# Patient Record
Sex: MALE | Race: OTHER | HISPANIC OR LATINO | URBAN - METROPOLITAN AREA
[De-identification: names, ages, dates, MRNs, and addresses within clinical notes are randomized per-mention and may not be internally consistent; named-entity substitution may affect disease eponyms.]

---

## 2017-12-12 ENCOUNTER — EMERGENCY (EMERGENCY)
Facility: HOSPITAL | Age: 27
LOS: 1 days | Discharge: HOME | End: 2017-12-12
Attending: EMERGENCY MEDICINE | Admitting: EMERGENCY MEDICINE
Payer: COMMERCIAL

## 2017-12-12 VITALS
DIASTOLIC BLOOD PRESSURE: 84 MMHG | SYSTOLIC BLOOD PRESSURE: 132 MMHG | HEART RATE: 66 BPM | TEMPERATURE: 98 F | WEIGHT: 160.06 LBS | RESPIRATION RATE: 18 BRPM | OXYGEN SATURATION: 97 %

## 2017-12-12 VITALS
HEART RATE: 57 BPM | RESPIRATION RATE: 18 BRPM | DIASTOLIC BLOOD PRESSURE: 67 MMHG | TEMPERATURE: 99 F | SYSTOLIC BLOOD PRESSURE: 102 MMHG | OXYGEN SATURATION: 97 %

## 2017-12-12 DIAGNOSIS — Z90.49 ACQUIRED ABSENCE OF OTHER SPECIFIED PARTS OF DIGESTIVE TRACT: Chronic | ICD-10-CM

## 2017-12-12 DIAGNOSIS — Z90.89 ACQUIRED ABSENCE OF OTHER ORGANS: ICD-10-CM

## 2017-12-12 DIAGNOSIS — R10.32 LEFT LOWER QUADRANT PAIN: ICD-10-CM

## 2017-12-12 DIAGNOSIS — Z88.1 ALLERGY STATUS TO OTHER ANTIBIOTIC AGENTS STATUS: ICD-10-CM

## 2017-12-12 LAB
ALBUMIN SERPL ELPH-MCNC: 4.5 G/DL — SIGNIFICANT CHANGE UP (ref 3.3–5)
ALP SERPL-CCNC: 88 U/L — SIGNIFICANT CHANGE UP (ref 40–120)
ALT FLD-CCNC: 28 U/L — SIGNIFICANT CHANGE UP (ref 10–45)
ANION GAP SERPL CALC-SCNC: 13 MMOL/L — SIGNIFICANT CHANGE UP (ref 5–17)
APPEARANCE UR: CLEAR — SIGNIFICANT CHANGE UP
AST SERPL-CCNC: 20 U/L — SIGNIFICANT CHANGE UP (ref 10–40)
BASOPHILS NFR BLD AUTO: 0.2 % — SIGNIFICANT CHANGE UP (ref 0–2)
BILIRUB SERPL-MCNC: 0.9 MG/DL — SIGNIFICANT CHANGE UP (ref 0.2–1.2)
BILIRUB UR-MCNC: NEGATIVE — SIGNIFICANT CHANGE UP
BUN SERPL-MCNC: 9 MG/DL — SIGNIFICANT CHANGE UP (ref 7–23)
CALCIUM SERPL-MCNC: 9.5 MG/DL — SIGNIFICANT CHANGE UP (ref 8.4–10.5)
CHLORIDE SERPL-SCNC: 98 MMOL/L — SIGNIFICANT CHANGE UP (ref 96–108)
CO2 SERPL-SCNC: 25 MMOL/L — SIGNIFICANT CHANGE UP (ref 22–31)
COLOR SPEC: YELLOW — SIGNIFICANT CHANGE UP
CREAT SERPL-MCNC: 0.82 MG/DL — SIGNIFICANT CHANGE UP (ref 0.5–1.3)
DIFF PNL FLD: NEGATIVE — SIGNIFICANT CHANGE UP
EOSINOPHIL NFR BLD AUTO: 1.5 % — SIGNIFICANT CHANGE UP (ref 0–6)
GLUCOSE SERPL-MCNC: 92 MG/DL — SIGNIFICANT CHANGE UP (ref 70–99)
GLUCOSE UR QL: NEGATIVE — SIGNIFICANT CHANGE UP
HCT VFR BLD CALC: 47.7 % — SIGNIFICANT CHANGE UP (ref 39–50)
HGB BLD-MCNC: 16.4 G/DL — SIGNIFICANT CHANGE UP (ref 13–17)
KETONES UR-MCNC: NEGATIVE — SIGNIFICANT CHANGE UP
LEUKOCYTE ESTERASE UR-ACNC: NEGATIVE — SIGNIFICANT CHANGE UP
LIDOCAIN IGE QN: 16 U/L — SIGNIFICANT CHANGE UP (ref 7–60)
LYMPHOCYTES # BLD AUTO: 25.8 % — SIGNIFICANT CHANGE UP (ref 13–44)
MCHC RBC-ENTMCNC: 29.7 PG — SIGNIFICANT CHANGE UP (ref 27–34)
MCHC RBC-ENTMCNC: 34.4 G/DL — SIGNIFICANT CHANGE UP (ref 32–36)
MCV RBC AUTO: 86.3 FL — SIGNIFICANT CHANGE UP (ref 80–100)
MONOCYTES NFR BLD AUTO: 9.2 % — SIGNIFICANT CHANGE UP (ref 2–14)
NEUTROPHILS NFR BLD AUTO: 63.3 % — SIGNIFICANT CHANGE UP (ref 43–77)
NITRITE UR-MCNC: NEGATIVE — SIGNIFICANT CHANGE UP
PH UR: 8 — SIGNIFICANT CHANGE UP (ref 5–8)
PLATELET # BLD AUTO: 197 K/UL — SIGNIFICANT CHANGE UP (ref 150–400)
POTASSIUM SERPL-MCNC: 3.9 MMOL/L — SIGNIFICANT CHANGE UP (ref 3.5–5.3)
POTASSIUM SERPL-SCNC: 3.9 MMOL/L — SIGNIFICANT CHANGE UP (ref 3.5–5.3)
PROT SERPL-MCNC: 8 G/DL — SIGNIFICANT CHANGE UP (ref 6–8.3)
PROT UR-MCNC: NEGATIVE MG/DL — SIGNIFICANT CHANGE UP
RBC # BLD: 5.53 M/UL — SIGNIFICANT CHANGE UP (ref 4.2–5.8)
RBC # FLD: 13.4 % — SIGNIFICANT CHANGE UP (ref 10.3–16.9)
SODIUM SERPL-SCNC: 136 MMOL/L — SIGNIFICANT CHANGE UP (ref 135–145)
SP GR SPEC: 1.01 — SIGNIFICANT CHANGE UP (ref 1–1.03)
UROBILINOGEN FLD QL: 0.2 E.U./DL — SIGNIFICANT CHANGE UP
WBC # BLD: 4.6 K/UL — SIGNIFICANT CHANGE UP (ref 3.8–10.5)
WBC # FLD AUTO: 4.6 K/UL — SIGNIFICANT CHANGE UP (ref 3.8–10.5)

## 2017-12-12 PROCEDURE — 81003 URINALYSIS AUTO W/O SCOPE: CPT

## 2017-12-12 PROCEDURE — 80053 COMPREHEN METABOLIC PANEL: CPT

## 2017-12-12 PROCEDURE — 99285 EMERGENCY DEPT VISIT HI MDM: CPT

## 2017-12-12 PROCEDURE — 83690 ASSAY OF LIPASE: CPT

## 2017-12-12 PROCEDURE — 74177 CT ABD & PELVIS W/CONTRAST: CPT | Mod: 26

## 2017-12-12 PROCEDURE — 85025 COMPLETE CBC W/AUTO DIFF WBC: CPT

## 2017-12-12 PROCEDURE — 74177 CT ABD & PELVIS W/CONTRAST: CPT

## 2017-12-12 PROCEDURE — 99284 EMERGENCY DEPT VISIT MOD MDM: CPT | Mod: 25

## 2017-12-12 PROCEDURE — 36415 COLL VENOUS BLD VENIPUNCTURE: CPT

## 2017-12-12 RX ORDER — IOHEXOL 300 MG/ML
50 INJECTION, SOLUTION INTRAVENOUS ONCE
Qty: 0 | Refills: 0 | Status: COMPLETED | OUTPATIENT
Start: 2017-12-12 | End: 2017-12-12

## 2017-12-12 RX ADMIN — IOHEXOL 50 MILLILITER(S): 300 INJECTION, SOLUTION INTRAVENOUS at 15:52

## 2017-12-12 RX ADMIN — Medication 1 TABLET(S): at 19:57

## 2017-12-12 NOTE — ED PROVIDER NOTE - OBJECTIVE STATEMENT
26yo M hx appendectomy here w/ LLQ abd pain x3 days. denies n/v/d, gu symptoms, testicular/penile pain or discharge.  [place] [facility] and was told to come here to CT scan because his insurance does not cover it there.  denies fever/chills. Never had this before. no fh of kidney stones. no radiation to flank. did not take any  meds, and does not want any here, pain is tolerable. States they did UA at urgent care and negative. They attempted to get outpatient CT but pts insurance did not cover it so was directed to the ED instead.

## 2017-12-12 NOTE — CONSULT NOTE ADULT - ASSESSMENT
27M with LLQ abdominal pain and CT findings concerning for infiltration of the fat anterior to the distal colon.  With evidence of diverticulosis and 6 days of pain, concern for developing diverticulitis.    - Would recommend PO Augmentin for 1 week and to follow up with Dr. Schilling next week [phone]  - Dispo as per ED  - Pt seen and examined with Dr. Schilling, discussed recommendations with ED Staff.

## 2017-12-12 NOTE — ED PROVIDER NOTE - MEDICAL DECISION MAKING DETAILS
here w/ LLQ tenderness w/ equivocal rebound, concern for complicated diverticulitis so CT w/ PO and IV contrast done. here w/ LLQ tenderness w/ equivocal rebound, concern for complicated diverticulitis so CT w/ PO and IV contrast done. neg for this, likely epiploic appendigitis. plan for otc meds and f/u gi. DC home in NAD with strict return precautions given.

## 2017-12-12 NOTE — ED PROVIDER NOTE - PHYSICAL EXAMINATION
CONSTITUTIONAL: Well-appearing; well-nourished; in no apparent distress.   HEAD: Normocephalic; atraumatic.   EYES:  conjunctiva and sclera clear  ENT: normal nose; no rhinorrhea; normal pharynx with no erythema or lesions.   NECK: Supple; non-tender;   CARDIOVASCULAR: Normal S1, S2; no murmurs, rubs, or gallops. Regular rate and rhythm.   RESPIRATORY: Breathing easily; breath sounds clear and equal bilaterally; no wheezes, rhonchi, or rales.  GI: Soft; non-distended; +LLQ tenderness, when asked about rebound states same amount of pain as w/ palpation; no palpable organomegaly. no cva tenderness.  EXT: No cyanosis or edema; N/V intact  SKIN: Normal for age and race; warm; dry; good turgor; no apparent lesions or rash.   NEURO: A & O x 3; face symmetric; grossly unremarkable.   PSYCHOLOGICAL: The patient’s mood and manner are appropriate.

## 2017-12-12 NOTE — CONSULT NOTE ADULT - SUBJECTIVE AND OBJECTIVE BOX
27M otherwise healthy with a surgical hx of an open appendectomy (2 yoa), b/l ochiopexy for testicular torsion (6 yoa) and an exploratory laparotomy with a SBR for an SBO () presenting to the [facility] ED with a 6 day hx of worsening, constant, crampy LLQ abdominal pain.  He denies any fevers, chills, nausea, vomiting, chest pain, SOB, dyspnea, change in bowel function, diarrhea, dysuria.    PMH: None  PSH: Appendectomy, SBR, orchiopexy b/l  All: Levaquin (intolerance - headache and felt faint)  Family Hx: denies IBD, colon cancer  Social: Never smoker, social ETOH, denies IVDA    Vital Signs Last 24 Hrs  T(C): 36.7 (12 Dec 2017 14:49), Max: 36.7 (12 Dec 2017 14:49)  T(F): 98.1 (12 Dec 2017 14:49), Max: 98.1 (12 Dec 2017 14:49)  HR: 66 (12 Dec 2017 14:49) (66 - 66)  BP: 132/84 (12 Dec 2017 14:49) (132/84 - 132/84)  BP(mean): --  RR: 18 (12 Dec 2017 14:49) (18 - 18)  SpO2: 97% (12 Dec 2017 14:49) (97% - 97%)    PHYSICAL EXAM:    Gen: Age appropriate male in NAD  CV: RRR  Resp: CTAB  Abd: Normoactive BS, soft, tender to palpation in the LLQ with no associated rebound/guarding/rigidity, nondistended.  Old well healed midline scar and mcburney incision.  Ext: WWP, no peripheral edema        LABS:                        16.4   4.6   )-----------( 197      ( 12 Dec 2017 16:01 )             47.7         136  |  98  |  9   ----------------------------<  92  3.9   |  25  |  0.82    Ca    9.5      12 Dec 2017 16:01    TPro  8.0  /  Alb  4.5  /  TBili  0.9  /  DBili  x   /  AST  20  /  ALT  28  /  AlkPhos  88  1212      Urinalysis Basic - ( 12 Dec 2017 16:01 )    Color: Yellow / Appearance: Clear / S.010 / pH: x  Gluc: x / Ketone: NEGATIVE  / Bili: Negative / Urobili: 0.2 E.U./dL   Blood: x / Protein: NEGATIVE mg/dL / Nitrite: NEGATIVE   Leuk Esterase: NEGATIVE / RBC: x / WBC x   Sq Epi: x / Non Sq Epi: x / Bacteria: x        RADIOLOGY & ADDITIONAL STUDIES:    INTERPRETATION:  Resident preliminary report    CT of the ABDOMEN and PELVIS with intravenous contrast dated 2017   6:07 PM    INDICATION: Worsening left lower quadrant pain for 5 days, with mild   rebound tenderness. S/p appendectomy.     TECHNIQUE: CT of the abdomen and pelvis was performed using oral and   intravenous contrast. Axial, sagittal and coronal images were produced   and reviewed.    PRIOR STUDIES: None.    FINDINGS: Images of the lower chest demonstrate no significant   abnormality.    The liver is normal in appearance.  No radiopaque stones are seen in the   gallbladder.  The pancreas is normal in appearance.  No splenic   abnormalities are seen.    The adrenal glands are unremarkable. The kidneys are normal in   appearance.        No abdominal aortic aneurysm is seen. No lymphadenopathy is seen.     Evaluation of the bowel demonstrates no evidence of bowel obstruction or   free air. Status post appendectomy. In the left lower abdomen anterior to   the distal descending colon there is infiltration of the fat. Minimal   colonic diverticulosis of the descending colon, but no diverticuli are   seen adjacent to the pericolonic fat stranding. No ascites is seen.    Images of the pelvis demonstrate the prostate and seminal vesicles to be   normal in size. Heterogeneous enhancement of the prostate gland and   seminal vesicles.  No filling defects are seen in the urinary bladder.     Evaluation of the osseous structures demonstrates no significant   abnormality.      IMPRESSION:  1.  Infiltration of the fat anterior to the distal descending colon.   Nonspecific finding. This may represent a developing epiploic   appendagitis.  2.  Status post appendectomy.  3.  No evidence of bowel obstruction or free air.  4.  Minimal colonic diverticulosis.   5.  Heterogeneous enhancement of the prostate gland and seminal vesicles   which is likely normal variant due to early phase of contrast   enhancement. Correlation with urinalysis is recommended.

## 2017-12-12 NOTE — ED ADULT NURSE NOTE - OBJECTIVE STATEMENT
states LLQ abd pain x3 days.  denies n/v/d.  went to [facility] MD and was told to come here to CT scan because his insurance does not cover it there.  denies fever/chills, no dysuria, hematuria.  left abd tender to touch.  abd soft, non distended. last BM this morning was normal.  states +flatulence.